# Patient Record
Sex: FEMALE | Race: WHITE
[De-identification: names, ages, dates, MRNs, and addresses within clinical notes are randomized per-mention and may not be internally consistent; named-entity substitution may affect disease eponyms.]

---

## 2018-02-02 ENCOUNTER — HOSPITAL ENCOUNTER (OUTPATIENT)
Dept: HOSPITAL 74 - JASU-ENDO | Age: 68
Discharge: HOME | End: 2018-02-02
Attending: INTERNAL MEDICINE
Payer: COMMERCIAL

## 2018-02-02 VITALS — DIASTOLIC BLOOD PRESSURE: 58 MMHG | HEART RATE: 62 BPM | SYSTOLIC BLOOD PRESSURE: 118 MMHG

## 2018-02-02 VITALS — BODY MASS INDEX: 30 KG/M2

## 2018-02-02 VITALS — TEMPERATURE: 97.4 F

## 2018-02-02 DIAGNOSIS — K64.8: ICD-10-CM

## 2018-02-02 DIAGNOSIS — K62.1: ICD-10-CM

## 2018-02-02 DIAGNOSIS — D12.2: ICD-10-CM

## 2018-02-02 DIAGNOSIS — Z86.010: Primary | ICD-10-CM

## 2018-02-02 DIAGNOSIS — D12.0: ICD-10-CM

## 2018-02-02 PROCEDURE — 0DBP8ZX EXCISION OF RECTUM, VIA NATURAL OR ARTIFICIAL OPENING ENDOSCOPIC, DIAGNOSTIC: ICD-10-PCS | Performed by: INTERNAL MEDICINE

## 2018-02-02 PROCEDURE — 0DBH8ZX EXCISION OF CECUM, VIA NATURAL OR ARTIFICIAL OPENING ENDOSCOPIC, DIAGNOSTIC: ICD-10-PCS | Performed by: INTERNAL MEDICINE

## 2018-02-02 PROCEDURE — 0DBK8ZX EXCISION OF ASCENDING COLON, VIA NATURAL OR ARTIFICIAL OPENING ENDOSCOPIC, DIAGNOSTIC: ICD-10-PCS | Performed by: INTERNAL MEDICINE

## 2018-02-05 NOTE — PATH
Surgical Pathology Report



Patient Name:  MICHAEL REEVES

Accession #:  

[facility]. Rec. #:  [id number]                                                        

   /Age/Gender:  1950 (Age: 67) / F

Account:  [id number]                                                          

             Location: ASU-ENDOSCOPY

Taken:  2018

Received:  2018

Reported:  2018

Physicians:  John Ulrich M.D.

  



Specimen(s) Received

A: BX RECTUM POLYP 

B: BX CECAL POLYP 

C: BX RIGHT COLON POLYP 





Clinical History

Preoperative diagnosis: Polyp surveillance

Postoperative diagnosis: Colon polyps (rectum, cecum, right colon







Final Diagnosis

A. RECTUM, POLYP, POLYPECTOMY:

HYPERPLASTIC POLYP.



B. RECTUM, POLYP, POLYPECTOMY:

POLYPOID FRAGMENT OF COLONIC MUCOSA WITHOUT SIGNIFICANT PATHOLOGIC FINDINGS.



C. COLON, RIGHT, POLYP, POLYPECTOMY:

POLYPOID FRAGMENT OF COLONIC MUCOSA WITH PROMINENT LYMPHOID AGGREGATE AND FOCAL

SUPERFICIAL HYPERPLASTIC FEATURES.







***Electronically Signed***

Shweta Joyner M.D.





Gross Description

A.  Received in formalin, labeled "biopsy polyp rectum" are 2 tan, irregular

portions of soft tissue measuring 0.2 and 0.3 cm. in greatest dimension. The

specimens are submitted in toto in one cassette.



B.  Received in formalin, labeled "biopsy rectal polyp" is a tan, irregular

portion of soft tissue measuring 0.2 cm. in greatest dimension. The specimen is

submitted in toto in one cassette.



C.  Received in formalin, labeled "biopsy right colon polyp" are 4 tan,

irregular portions of soft tissue ranging from 0.1-0.2 cm. in greatest

dimension. The specimens are submitted in toto in one cassette.

## 2022-12-12 ENCOUNTER — OFFICE (OUTPATIENT)
Dept: URBAN - METROPOLITAN AREA CLINIC 121 | Facility: CLINIC | Age: 72
Setting detail: OPHTHALMOLOGY
End: 2022-12-12
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
## 2022-12-12 PROCEDURE — NO SHOW FE NO SHOW FEE: Performed by: OPHTHALMOLOGY

## 2022-12-28 ENCOUNTER — OFFICE (OUTPATIENT)
Dept: URBAN - METROPOLITAN AREA CLINIC 121 | Facility: CLINIC | Age: 72
Setting detail: OPHTHALMOLOGY
End: 2022-12-28
Payer: MEDICARE

## 2022-12-28 DIAGNOSIS — H35.3131: ICD-10-CM

## 2022-12-28 DIAGNOSIS — H25.13: ICD-10-CM

## 2022-12-28 DIAGNOSIS — H35.54: ICD-10-CM

## 2022-12-28 DIAGNOSIS — H35.033: ICD-10-CM

## 2022-12-28 DIAGNOSIS — H31.002: ICD-10-CM

## 2022-12-28 DIAGNOSIS — H40.013: ICD-10-CM

## 2022-12-28 PROCEDURE — 92250 FUNDUS PHOTOGRAPHY W/I&R: CPT | Performed by: OPHTHALMOLOGY

## 2022-12-28 PROCEDURE — 92014 COMPRE OPH EXAM EST PT 1/>: CPT | Performed by: OPHTHALMOLOGY

## 2022-12-28 ASSESSMENT — PACHYMETRY
OS_CT_UM: 548
OD_CT_CORRECTION: -1
OS_CT_CORRECTION: 0
OD_CT_UM: 563

## 2022-12-28 ASSESSMENT — KERATOMETRY
OD_K2POWER_DIOPTERS: 45.75
OD_K1POWER_DIOPTERS: 45.25
OD_AXISANGLE_DEGREES: 015
METHOD_AUTO_MANUAL: AUTO
OS_K1POWER_DIOPTERS: 45.50
OS_AXISANGLE_DEGREES: 130
OS_K2POWER_DIOPTERS: 45.75

## 2022-12-28 ASSESSMENT — SPHEQUIV_DERIVED
OS_SPHEQUIV: 0.375
OD_SPHEQUIV: 0.875

## 2022-12-28 ASSESSMENT — REFRACTION_CURRENTRX
OS_OVR_VA: 20/
OS_AXIS: 101
OD_OVR_VA: 20/
OS_SPHERE: +1.50
OD_AXIS: 095
OD_SPHERE: +1.50
OS_VPRISM_DIRECTION: SV
OD_VPRISM_DIRECTION: SV
OS_CYLINDER: +0.25
OD_CYLINDER: +0.25

## 2022-12-28 ASSESSMENT — REFRACTION_AUTOREFRACTION
OS_CYLINDER: +1.25
OD_SPHERE: 0.00
OD_AXIS: 180
OS_AXIS: 001
OD_CYLINDER: +1.75
OS_SPHERE: -0.25

## 2022-12-28 ASSESSMENT — VISUAL ACUITY
OS_BCVA: 20/20
OD_BCVA: 20/20

## 2022-12-28 ASSESSMENT — CONFRONTATIONAL VISUAL FIELD TEST (CVF)
OD_FINDINGS: FULL
OS_FINDINGS: FULL

## 2022-12-28 ASSESSMENT — TONOMETRY
OS_IOP_MMHG: 14
OD_IOP_MMHG: 14

## 2022-12-28 ASSESSMENT — AXIALLENGTH_DERIVED
OD_AL: 22.5633
OS_AL: 22.7003

## 2023-12-29 ENCOUNTER — OFFICE (OUTPATIENT)
Dept: URBAN - METROPOLITAN AREA CLINIC 121 | Facility: CLINIC | Age: 73
Setting detail: OPHTHALMOLOGY
End: 2023-12-29
Payer: MEDICARE

## 2023-12-29 DIAGNOSIS — H40.013: ICD-10-CM

## 2023-12-29 DIAGNOSIS — H31.002: ICD-10-CM

## 2023-12-29 DIAGNOSIS — H25.13: ICD-10-CM

## 2023-12-29 DIAGNOSIS — H35.033: ICD-10-CM

## 2023-12-29 DIAGNOSIS — H35.54: ICD-10-CM

## 2023-12-29 DIAGNOSIS — H35.3131: ICD-10-CM

## 2023-12-29 PROCEDURE — 92250 FUNDUS PHOTOGRAPHY W/I&R: CPT | Performed by: OPHTHALMOLOGY

## 2023-12-29 PROCEDURE — 92014 COMPRE OPH EXAM EST PT 1/>: CPT | Performed by: OPHTHALMOLOGY

## 2023-12-29 ASSESSMENT — REFRACTION_AUTOREFRACTION
OD_AXIS: 176
OS_AXIS: 174
OD_SPHERE: +0.25
OS_SPHERE: -0.25
OD_CYLINDER: +1.50
OS_CYLINDER: +1.25

## 2023-12-29 ASSESSMENT — REFRACTION_CURRENTRX
OD_OVR_VA: 20/
OS_SPHERE: +1.25
OD_CYLINDER: +0.25
OS_AXIS: 180
OS_VPRISM_DIRECTION: SV
OS_OVR_VA: 20/
OS_CYLINDER: 0.00
OD_VPRISM_DIRECTION: SV
OD_AXIS: 139
OD_SPHERE: +1.50

## 2023-12-29 ASSESSMENT — SPHEQUIV_DERIVED
OS_SPHEQUIV: 0.375
OD_SPHEQUIV: 1

## 2023-12-29 ASSESSMENT — CONFRONTATIONAL VISUAL FIELD TEST (CVF)
OD_FINDINGS: FULL
OS_FINDINGS: FULL

## 2025-02-05 ENCOUNTER — OFFICE (OUTPATIENT)
Facility: LOCATION | Age: 75
Setting detail: OPHTHALMOLOGY
End: 2025-02-05
Payer: MEDICARE

## 2025-02-05 DIAGNOSIS — H25.13: ICD-10-CM

## 2025-02-05 DIAGNOSIS — H40.013: ICD-10-CM

## 2025-02-05 DIAGNOSIS — H35.3131: ICD-10-CM

## 2025-02-05 DIAGNOSIS — H31.002: ICD-10-CM

## 2025-02-05 DIAGNOSIS — H35.033: ICD-10-CM

## 2025-02-05 DIAGNOSIS — H35.54: ICD-10-CM

## 2025-02-05 PROCEDURE — 92012 INTRM OPH EXAM EST PATIENT: CPT | Performed by: OPHTHALMOLOGY

## 2025-02-05 PROCEDURE — 92133 CPTRZD OPH DX IMG PST SGM ON: CPT | Performed by: OPHTHALMOLOGY

## 2025-02-05 PROCEDURE — 92083 EXTENDED VISUAL FIELD XM: CPT | Performed by: OPHTHALMOLOGY

## 2025-02-05 ASSESSMENT — REFRACTION_CURRENTRX
OS_OVR_VA: 20/
OS_SPHERE: +1.25
OS_VPRISM_DIRECTION: SV
OD_OVR_VA: 20/
OS_AXIS: 180
OD_AXIS: 139
OS_CYLINDER: 0.00
OD_SPHERE: +1.50
OD_CYLINDER: +0.25
OD_VPRISM_DIRECTION: SV

## 2025-02-05 ASSESSMENT — PACHYMETRY
OS_CT_CORRECTION: 0
OD_CT_UM: 563
OS_CT_UM: 548
OD_CT_CORRECTION: -1

## 2025-02-05 ASSESSMENT — REFRACTION_AUTOREFRACTION
OS_SPHERE: -0.25
OS_AXIS: 174
OD_AXIS: 176
OS_CYLINDER: +1.25
OD_CYLINDER: +1.50
OD_SPHERE: +0.25

## 2025-02-05 ASSESSMENT — CONFRONTATIONAL VISUAL FIELD TEST (CVF)
OD_FINDINGS: FULL
OS_FINDINGS: FULL

## 2025-02-05 ASSESSMENT — KERATOMETRY
OD_K2POWER_DIOPTERS: 45.75
METHOD_AUTO_MANUAL: AUTO
OD_K1POWER_DIOPTERS: 45.25
OS_AXISANGLE_DEGREES: 090
OS_K1POWER_DIOPTERS: 45.50
OD_AXISANGLE_DEGREES: 006
OS_K2POWER_DIOPTERS: 45.75

## 2025-02-05 ASSESSMENT — VISUAL ACUITY
OS_BCVA: 20/30
OD_BCVA: 20/25

## 2025-02-05 ASSESSMENT — TONOMETRY
OS_IOP_MMHG: 17
OD_IOP_MMHG: 17

## 2025-08-11 ENCOUNTER — OFFICE (OUTPATIENT)
Facility: LOCATION | Age: 75
Setting detail: OPHTHALMOLOGY
End: 2025-08-11
Payer: MEDICARE

## 2025-08-11 DIAGNOSIS — H40.013: ICD-10-CM

## 2025-08-11 DIAGNOSIS — H35.54: ICD-10-CM

## 2025-08-11 DIAGNOSIS — H35.3131: ICD-10-CM

## 2025-08-11 DIAGNOSIS — H25.13: ICD-10-CM

## 2025-08-11 DIAGNOSIS — H31.002: ICD-10-CM

## 2025-08-11 DIAGNOSIS — H35.033: ICD-10-CM

## 2025-08-11 PROCEDURE — 92250 FUNDUS PHOTOGRAPHY W/I&R: CPT | Performed by: OPHTHALMOLOGY

## 2025-08-11 PROCEDURE — 92014 COMPRE OPH EXAM EST PT 1/>: CPT | Performed by: OPHTHALMOLOGY

## 2025-08-11 ASSESSMENT — KERATOMETRY
OS_K2POWER_DIOPTERS: 46.25
OS_AXISANGLE_DEGREES: 103
OD_K2POWER_DIOPTERS: 45.75
OS_K1POWER_DIOPTERS: 45.50
OD_AXISANGLE_DEGREES: 032
OD_K1POWER_DIOPTERS: 45.50
METHOD_AUTO_MANUAL: AUTO

## 2025-08-11 ASSESSMENT — REFRACTION_AUTOREFRACTION
OS_SPHERE: +0.25
OD_CYLINDER: +0.25
OS_CYLINDER: +0.75
OS_AXIS: 004
OD_AXIS: 007
OD_SPHERE: +0.50

## 2025-08-11 ASSESSMENT — PACHYMETRY
OS_CT_CORRECTION: 0
OS_CT_UM: 548
OD_CT_UM: 563
OD_CT_CORRECTION: -1

## 2025-08-11 ASSESSMENT — REFRACTION_CURRENTRX
OS_OVR_VA: 20/
OS_AXIS: 180
OD_AXIS: 139
OS_VPRISM_DIRECTION: SV
OD_VPRISM_DIRECTION: SV
OS_SPHERE: +1.25
OS_CYLINDER: 0.00
OD_CYLINDER: +0.25
OD_SPHERE: +1.50
OD_OVR_VA: 20/

## 2025-08-11 ASSESSMENT — VISUAL ACUITY
OD_BCVA: 20/25
OS_BCVA: 20/30

## 2025-08-11 ASSESSMENT — TONOMETRY
OS_IOP_MMHG: 12
OD_IOP_MMHG: 12

## 2025-08-11 ASSESSMENT — CONFRONTATIONAL VISUAL FIELD TEST (CVF)
OD_FINDINGS: FULL
OS_FINDINGS: FULL